# Patient Record
Sex: MALE | Race: WHITE | NOT HISPANIC OR LATINO | URBAN - METROPOLITAN AREA
[De-identification: names, ages, dates, MRNs, and addresses within clinical notes are randomized per-mention and may not be internally consistent; named-entity substitution may affect disease eponyms.]

---

## 2017-05-30 ENCOUNTER — AMBULATORY - RIVER FALLS (OUTPATIENT)
Dept: FAMILY MEDICINE | Facility: CLINIC | Age: 27
End: 2017-05-30

## 2019-07-23 ENCOUNTER — RECORDS - HEALTHEAST (OUTPATIENT)
Dept: LAB | Facility: HOSPITAL | Age: 29
End: 2019-07-23

## 2019-07-24 LAB
MEV IGG SER IA-ACNC: POSITIVE
MUV IGG SER QL IA: NEGATIVE
RUBV IGG SERPL QL IA: NEGATIVE
VZV IGG SER QL IA: NORMAL

## 2019-07-26 LAB
GAMMA INTERFERON BACKGROUND BLD IA-ACNC: 0.04 IU/ML
M TB IFN-G BLD-IMP: NEGATIVE
MITOGEN IGNF BCKGRD COR BLD-ACNC: -0.01 IU/ML
MITOGEN IGNF BCKGRD COR BLD-ACNC: -0.02 IU/ML
QTF INTERPRETATION: NORMAL
QTF MITOGEN - NIL: >10 IU/ML

## 2020-05-11 ENCOUNTER — RECORDS - HEALTHEAST (OUTPATIENT)
Dept: LAB | Facility: CLINIC | Age: 30
End: 2020-05-11

## 2020-05-12 LAB — HBV SURFACE AB SERPL IA-ACNC: POSITIVE M[IU]/ML

## 2020-08-10 ENCOUNTER — OFFICE VISIT - HEALTHEAST (OUTPATIENT)
Dept: INTERNAL MEDICINE | Facility: CLINIC | Age: 30
End: 2020-08-10

## 2020-08-10 DIAGNOSIS — F43.23 SITUATIONAL MIXED ANXIETY AND DEPRESSIVE DISORDER: ICD-10-CM

## 2020-08-10 RX ORDER — UREA 10 %
54 LOTION (ML) TOPICAL DAILY
Status: SHIPPED | COMMUNITY
Start: 2020-08-10

## 2020-08-10 ASSESSMENT — ANXIETY QUESTIONNAIRES
1. FEELING NERVOUS, ANXIOUS, OR ON EDGE: MORE THAN HALF THE DAYS
2. NOT BEING ABLE TO STOP OR CONTROL WORRYING: SEVERAL DAYS
6. BECOMING EASILY ANNOYED OR IRRITABLE: SEVERAL DAYS
7. FEELING AFRAID AS IF SOMETHING AWFUL MIGHT HAPPEN: NOT AT ALL
4. TROUBLE RELAXING: SEVERAL DAYS
IF YOU CHECKED OFF ANY PROBLEMS ON THIS QUESTIONNAIRE, HOW DIFFICULT HAVE THESE PROBLEMS MADE IT FOR YOU TO DO YOUR WORK, TAKE CARE OF THINGS AT HOME, OR GET ALONG WITH OTHER PEOPLE: SOMEWHAT DIFFICULT
5. BEING SO RESTLESS THAT IT IS HARD TO SIT STILL: SEVERAL DAYS
GAD7 TOTAL SCORE: 7
3. WORRYING TOO MUCH ABOUT DIFFERENT THINGS: SEVERAL DAYS

## 2020-08-10 ASSESSMENT — MIFFLIN-ST. JEOR: SCORE: 1689.74

## 2020-08-10 ASSESSMENT — PATIENT HEALTH QUESTIONNAIRE - PHQ9: SUM OF ALL RESPONSES TO PHQ QUESTIONS 1-9: 7

## 2020-08-14 ENCOUNTER — OFFICE VISIT - HEALTHEAST (OUTPATIENT)
Dept: FAMILY MEDICINE | Facility: CLINIC | Age: 30
End: 2020-08-14

## 2020-08-14 DIAGNOSIS — M54.2 NECK PAIN, ACUTE: ICD-10-CM
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
## 2020-08-14 DIAGNOSIS — R68.84 JAW PAIN: ICD-10-CM

## 2020-08-18 ENCOUNTER — COMMUNICATION - HEALTHEAST (OUTPATIENT)
Dept: INTERNAL MEDICINE | Facility: CLINIC | Age: 30
End: 2020-08-18

## 2021-05-26 VITALS
SYSTOLIC BLOOD PRESSURE: 121 MMHG | HEART RATE: 60 BPM | TEMPERATURE: 98.6 F | OXYGEN SATURATION: 98 % | DIASTOLIC BLOOD PRESSURE: 69 MMHG

## 2021-05-27 ASSESSMENT — PATIENT HEALTH QUESTIONNAIRE - PHQ9: SUM OF ALL RESPONSES TO PHQ QUESTIONS 1-9: 7

## 2021-05-28 ASSESSMENT — ANXIETY QUESTIONNAIRES: GAD7 TOTAL SCORE: 7

## 2021-06-04 VITALS
SYSTOLIC BLOOD PRESSURE: 122 MMHG | HEIGHT: 73 IN | OXYGEN SATURATION: 98 % | BODY MASS INDEX: 19.75 KG/M2 | DIASTOLIC BLOOD PRESSURE: 62 MMHG | WEIGHT: 149 LBS | HEART RATE: 65 BPM
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
## 2021-06-10 NOTE — PROGRESS NOTES
[facility] Note  Seth Rios   29 y.o. male    Date of Visit: 8/10/2020  Chief Complaint   Patient presents with     Establish Care     not fasting     Fmla Paperwork     FMLA/[employer] paperwork     Anxiety     Assessment/Plan  1. Situational mixed anxiety and depressive disorder  Manifested as a single episode of a panic attack in the setting of dealing with stressors of COVID 19 pandemic at his workplace.  Doing quite well, with  scarring mild severity for both PHQ 9 and QI 7.  Will complete FMLA paperwork for 4-week leave of absence with recommendation for watchful waiting and continuing to work with [employer].  After which, will likely consider establishing a relationship with her therapist.     Much or all of the text in this note was generated through the use of Dragon Dictate voice-to-text software. Errors in spelling or words which seem out of context are unintentional. Sound alike errors, in particular, may have escaped editing  Laurent Dillon MD    Return if symptoms worsen or fail to improve.    Subjective  This 29 y.o. old male. Here to discuss filling out FMLA paperwork in the setting of having a panic attack. Feels safe at home. Will work on establishing a relationship with a therapist. Has been working in a stressful work environment, hyperventilating, diaphoretic. This happened on 7/28/20 and leave started 8/1/20. No tremors. Works in the lab department in the hospital. Reports not necessarily feeling super valued in the work environment and considering working on a casual/part time/as needed basis. Not having any significant Sx affecting his basic ADLs. States he has not been dx with depression but he has struggled with it for year. States he has stayed away from medications due to side effects. No suicidal or homicidal ideations.     ROS A comprehensive review of systems was performed and was otherwise negative    Medications, allergies, and problem list were reviewed  and updated    Exam  General appearance: Pleasant, nontoxic-appearing, no acute distress, alert and oriented x4  Vitals:    08/10/20 1418   BP: 122/62   Pulse: 65   SpO2: 98%   HEAD, EARS, NOSE, MOUTH, AND THROAT: Head and face were normal. Hearing was normal to voice.   RESPIRATORY: Bilaterally with no crackles, wheezing or rhonchi  CARDIOVASCULAR: Regular S1 and S2.  Radial pulses intact.  No edema.  GASTROINTESTINAL: NABS, soft, nontender, nondistended  MUSCULOSKELETAL: Skeletal configuration was normal and muscle mass was normal for age.   SKIN/HAIR/NAILS: Skin color was normal.   NEUROLOGIC: Alert and oriented to person, place, time, and circumstance. Speech was normal.   PSYCHIATRIC:  Mood and affect were normal and the patient had normal recent and remote memory. The patient's judgment and insight were normal.  Additional Information   Current Outpatient Medications   Medication Sig Dispense Refill     magnesium gluconate (MAGONATE) 27 mg magnesium (500 mg) Tab tablet Take 54 mg by mouth daily.       multivitamin therapeutic tablet Take 1 tablet by mouth daily.       No current facility-administered medications for this visit.      No Known Allergies  Social History     Social History Narrative     Not on file     Social History     Tobacco Use     Smoking status: Never Smoker     Smokeless tobacco: Never Used   Substance Use Topics     Alcohol use: Yes     Alcohol/week: 3.0 standard drinks     Types: 3 Standard drinks or equivalent per week     Frequency: 4 or more times a week     Drinks per session: 3 or 4     Binge frequency: Less than monthly     Drug use: Never     History reviewed. No pertinent family history.  Past Surgical History:   Procedure Laterality Date     EXTERNAL EAR SURGERY Bilateral 1996    cosmetic   Time: total time spent with the patient was 30 minutes of which >50% was spent in counseling and coordination of care

## 2021-06-10 NOTE — TELEPHONE ENCOUNTER
Forms Request  Name of form/paperwork: Other:  short term disability form  [employer] - Completed attending  physician statement.  Have you been seen for this request: Yes:  08/10/20  Do we have the form: Yes- 08/10/20  When is form needed by: asap faxed [employer]   How would you like the form returned: Fax:  [phone]  Patient Notified form requests are processed in 3-5 business days: Yes    Okay to leave a detailed message? Yes

## 2021-06-10 NOTE — TELEPHONE ENCOUNTER
Spoke with patient and let him know Dr. Dillon will sign paperwork tomorrow and it will be faxed.    Michelle JACKSON LPN .......... 3:52 PM  08/18/20  [facility]

## 2021-06-16 PROBLEM — F43.23 SITUATIONAL MIXED ANXIETY AND DEPRESSIVE DISORDER: Status: ACTIVE | Noted: 2020-07-28

## 2021-06-27 ENCOUNTER — HEALTH MAINTENANCE LETTER (OUTPATIENT)
Age: 31
End: 2021-06-27

## 2021-10-17 ENCOUNTER — HEALTH MAINTENANCE LETTER (OUTPATIENT)
Age: 31
End: 2021-10-17

## 2022-07-23 ENCOUNTER — HEALTH MAINTENANCE LETTER (OUTPATIENT)
Age: 32
End: 2022-07-23

## 2022-10-01 ENCOUNTER — HEALTH MAINTENANCE LETTER (OUTPATIENT)
Age: 32
End: 2022-10-01

## 2023-08-12 ENCOUNTER — HEALTH MAINTENANCE LETTER (OUTPATIENT)
Age: 33
End: 2023-08-12